# Patient Record
Sex: FEMALE | Race: WHITE | HISPANIC OR LATINO
[De-identification: names, ages, dates, MRNs, and addresses within clinical notes are randomized per-mention and may not be internally consistent; named-entity substitution may affect disease eponyms.]

---

## 2017-01-04 ENCOUNTER — APPOINTMENT (OUTPATIENT)
Dept: CARDIOLOGY | Facility: HOSPITAL | Age: 47
End: 2017-01-04

## 2017-02-09 ENCOUNTER — APPOINTMENT (OUTPATIENT)
Dept: OPHTHALMOLOGY | Facility: CLINIC | Age: 47
End: 2017-02-09

## 2017-02-09 ENCOUNTER — APPOINTMENT (OUTPATIENT)
Dept: OBGYN | Facility: CLINIC | Age: 47
End: 2017-02-09

## 2017-03-16 ENCOUNTER — LABORATORY RESULT (OUTPATIENT)
Age: 47
End: 2017-03-16

## 2017-03-16 ENCOUNTER — APPOINTMENT (OUTPATIENT)
Dept: OPHTHALMOLOGY | Facility: CLINIC | Age: 47
End: 2017-03-16

## 2017-03-16 ENCOUNTER — OUTPATIENT (OUTPATIENT)
Dept: OUTPATIENT SERVICES | Facility: HOSPITAL | Age: 47
LOS: 1 days | End: 2017-03-16
Payer: SELF-PAY

## 2017-03-16 ENCOUNTER — OUTPATIENT (OUTPATIENT)
Dept: OUTPATIENT SERVICES | Facility: HOSPITAL | Age: 47
LOS: 1 days | End: 2017-03-16

## 2017-03-16 ENCOUNTER — APPOINTMENT (OUTPATIENT)
Dept: OBGYN | Facility: CLINIC | Age: 47
End: 2017-03-16

## 2017-03-16 VITALS — BODY MASS INDEX: 25.45 KG/M2 | DIASTOLIC BLOOD PRESSURE: 80 MMHG | SYSTOLIC BLOOD PRESSURE: 120 MMHG | WEIGHT: 126 LBS

## 2017-03-16 DIAGNOSIS — N76.0 ACUTE VAGINITIS: ICD-10-CM

## 2017-03-16 PROCEDURE — 82670 ASSAY OF TOTAL ESTRADIOL: CPT

## 2017-03-16 PROCEDURE — G0463: CPT

## 2017-03-16 PROCEDURE — 83001 ASSAY OF GONADOTROPIN (FSH): CPT

## 2017-03-17 LAB
ESTRADIOL FREE SERPL-MCNC: <5 PG/ML — SIGNIFICANT CHANGE UP
FSH SERPL-MCNC: 67 IU/L — SIGNIFICANT CHANGE UP

## 2017-03-22 DIAGNOSIS — Z01.419 ENCOUNTER FOR GYNECOLOGICAL EXAMINATION (GENERAL) (ROUTINE) WITHOUT ABNORMAL FINDINGS: ICD-10-CM

## 2017-03-29 ENCOUNTER — APPOINTMENT (OUTPATIENT)
Dept: MAMMOGRAPHY | Facility: CLINIC | Age: 47
End: 2017-03-29

## 2017-04-13 ENCOUNTER — APPOINTMENT (OUTPATIENT)
Dept: MAMMOGRAPHY | Facility: CLINIC | Age: 47
End: 2017-04-13

## 2017-04-13 ENCOUNTER — APPOINTMENT (OUTPATIENT)
Dept: ULTRASOUND IMAGING | Facility: CLINIC | Age: 47
End: 2017-04-13

## 2017-04-13 ENCOUNTER — OUTPATIENT (OUTPATIENT)
Dept: OUTPATIENT SERVICES | Facility: HOSPITAL | Age: 47
LOS: 1 days | End: 2017-04-13
Payer: COMMERCIAL

## 2017-04-13 DIAGNOSIS — Z00.8 ENCOUNTER FOR OTHER GENERAL EXAMINATION: ICD-10-CM

## 2017-04-13 PROCEDURE — 76830 TRANSVAGINAL US NON-OB: CPT

## 2017-04-13 PROCEDURE — 77067 SCR MAMMO BI INCL CAD: CPT

## 2017-04-13 PROCEDURE — 76641 ULTRASOUND BREAST COMPLETE: CPT

## 2017-04-13 PROCEDURE — 77063 BREAST TOMOSYNTHESIS BI: CPT

## 2017-04-18 DIAGNOSIS — R10.2 PELVIC AND PERINEAL PAIN: ICD-10-CM

## 2017-04-18 DIAGNOSIS — R92.2 INCONCLUSIVE MAMMOGRAM: ICD-10-CM

## 2017-04-18 DIAGNOSIS — Z12.31 ENCOUNTER FOR SCREENING MAMMOGRAM FOR MALIGNANT NEOPLASM OF BREAST: ICD-10-CM

## 2017-06-08 DIAGNOSIS — H04.123 DRY EYE SYNDROME OF BILATERAL LACRIMAL GLANDS: ICD-10-CM

## 2017-11-24 ENCOUNTER — OUTPATIENT (OUTPATIENT)
Dept: OUTPATIENT SERVICES | Facility: HOSPITAL | Age: 47
LOS: 1 days | End: 2017-11-24
Payer: COMMERCIAL

## 2017-11-24 ENCOUNTER — APPOINTMENT (OUTPATIENT)
Dept: RADIOLOGY | Facility: CLINIC | Age: 47
End: 2017-11-24
Payer: COMMERCIAL

## 2017-11-24 DIAGNOSIS — Z00.8 ENCOUNTER FOR OTHER GENERAL EXAMINATION: ICD-10-CM

## 2017-11-24 PROCEDURE — 72100 X-RAY EXAM L-S SPINE 2/3 VWS: CPT

## 2017-11-24 PROCEDURE — 72100 X-RAY EXAM L-S SPINE 2/3 VWS: CPT | Mod: 26

## 2018-03-03 ENCOUNTER — OUTPATIENT (OUTPATIENT)
Dept: OUTPATIENT SERVICES | Facility: HOSPITAL | Age: 48
LOS: 1 days | End: 2018-03-03
Payer: COMMERCIAL

## 2018-03-03 ENCOUNTER — APPOINTMENT (OUTPATIENT)
Dept: ULTRASOUND IMAGING | Facility: CLINIC | Age: 48
End: 2018-03-03
Payer: COMMERCIAL

## 2018-03-03 DIAGNOSIS — Z00.8 ENCOUNTER FOR OTHER GENERAL EXAMINATION: ICD-10-CM

## 2018-03-03 PROCEDURE — 76856 US EXAM PELVIC COMPLETE: CPT

## 2018-03-03 PROCEDURE — 76830 TRANSVAGINAL US NON-OB: CPT | Mod: 26

## 2018-03-03 PROCEDURE — 76830 TRANSVAGINAL US NON-OB: CPT

## 2018-03-03 PROCEDURE — 76856 US EXAM PELVIC COMPLETE: CPT | Mod: 26

## 2018-03-20 ENCOUNTER — RESULT REVIEW (OUTPATIENT)
Age: 48
End: 2018-03-20

## 2018-04-20 ENCOUNTER — APPOINTMENT (OUTPATIENT)
Dept: MAMMOGRAPHY | Facility: CLINIC | Age: 48
End: 2018-04-20
Payer: COMMERCIAL

## 2018-04-20 ENCOUNTER — APPOINTMENT (OUTPATIENT)
Dept: ULTRASOUND IMAGING | Facility: CLINIC | Age: 48
End: 2018-04-20
Payer: COMMERCIAL

## 2018-04-20 ENCOUNTER — OUTPATIENT (OUTPATIENT)
Dept: OUTPATIENT SERVICES | Facility: HOSPITAL | Age: 48
LOS: 1 days | End: 2018-04-20
Payer: COMMERCIAL

## 2018-04-20 DIAGNOSIS — Z00.8 ENCOUNTER FOR OTHER GENERAL EXAMINATION: ICD-10-CM

## 2018-04-20 PROCEDURE — G0279: CPT | Mod: 26

## 2018-04-20 PROCEDURE — 77066 DX MAMMO INCL CAD BI: CPT

## 2018-04-20 PROCEDURE — 77066 DX MAMMO INCL CAD BI: CPT | Mod: 26

## 2018-04-20 PROCEDURE — G0279: CPT

## 2018-04-20 PROCEDURE — 76641 ULTRASOUND BREAST COMPLETE: CPT | Mod: 26,LT

## 2018-04-20 PROCEDURE — 76641 ULTRASOUND BREAST COMPLETE: CPT

## 2019-04-12 ENCOUNTER — APPOINTMENT (OUTPATIENT)
Dept: RHEUMATOLOGY | Facility: HOSPITAL | Age: 49
End: 2019-04-12

## 2019-04-12 ENCOUNTER — OUTPATIENT (OUTPATIENT)
Dept: OUTPATIENT SERVICES | Facility: HOSPITAL | Age: 49
LOS: 1 days | End: 2019-04-12
Payer: SELF-PAY

## 2019-04-12 VITALS
RESPIRATION RATE: 16 BRPM | SYSTOLIC BLOOD PRESSURE: 118 MMHG | BODY MASS INDEX: 23.99 KG/M2 | HEART RATE: 74 BPM | WEIGHT: 119 LBS | DIASTOLIC BLOOD PRESSURE: 76 MMHG | HEIGHT: 59 IN

## 2019-04-12 DIAGNOSIS — M06.9 RHEUMATOID ARTHRITIS, UNSPECIFIED: ICD-10-CM

## 2019-04-12 LAB
ERYTHROCYTE [SEDIMENTATION RATE] IN BLOOD: 28 MM/HR — HIGH (ref 0–15)
RHEUMATOID FACT SERPL-ACNC: <10 IU/ML — SIGNIFICANT CHANGE UP (ref 0–13)

## 2019-04-12 PROCEDURE — 85652 RBC SED RATE AUTOMATED: CPT

## 2019-04-12 PROCEDURE — 86235 NUCLEAR ANTIGEN ANTIBODY: CPT

## 2019-04-12 PROCEDURE — G0463: CPT

## 2019-04-12 PROCEDURE — 86038 ANTINUCLEAR ANTIBODIES: CPT

## 2019-04-12 PROCEDURE — 86431 RHEUMATOID FACTOR QUANT: CPT

## 2019-04-12 RX ORDER — CHOLECALCIFEROL (VITAMIN D3) 125 MCG
50 MCG TABLET ORAL DAILY
Refills: 0 | Status: ACTIVE | COMMUNITY
Start: 2019-04-12

## 2019-04-12 NOTE — PHYSICAL EXAM
[General Appearance - In No Acute Distress] : in no acute distress [General Appearance - Well Nourished] : well nourished [General Appearance - Alert] : alert [General Appearance - Well-Appearing] : healthy appearing [Sclera] : the sclera and conjunctiva were normal [PERRL With Normal Accommodation] : pupils were equal in size, round, and reactive to light [Extraocular Movements] : extraocular movements were intact [Hearing Threshold Finger Rub Not [place]] : hearing was normal [Outer Ear] : the ears and nose were normal in appearance [Neck Appearance] : the appearance of the neck was normal [Nasal Cavity] : the nasal mucosa and septum were normal [] : no respiratory distress [Heart Sounds] : normal S1 and S2 [Auscultation Breath Sounds / Voice Sounds] : lungs were clear to auscultation bilaterally [Murmurs] : no murmurs [Edema] : there was no peripheral edema [Abdomen Soft] : soft [Cervical Lymph Nodes Enlarged Posterior Bilaterally] : posterior cervical [Abdomen Tenderness] : non-tender [Cervical Lymph Nodes Enlarged Anterior Bilaterally] : anterior cervical [No Spinal Tenderness] : no spinal tenderness [Supraclavicular Lymph Nodes Enlarged Bilaterally] : supraclavicular [No Focal Deficits] : no focal deficits [Oriented To Time, Place, And Person] : oriented to person, place, and time [FreeTextEntry1] : DIEGO on LE b/l

## 2019-04-12 NOTE — END OF VISIT
[] : Fellow [FreeTextEntry3] : Patient seen with Dr Schwarz. Joint pains, muscle pains and fatigue with recent worsening. Strong family history of RA. No evidence of joint inflammation. Serologies negative. Reflux++. Check for H pylori. Gyn to deal with menopause symptoms. Can return if symptoms worsen.  [>50% of Time Spent on Counseling for ____] : Greater than 50% of the encounter time was spent on counseling for [unfilled] [Time Spent: ___ minutes] : I have spent [unfilled] minutes of face to face time with the patient

## 2019-04-12 NOTE — HISTORY OF PRESENT ILLNESS
[Malaise] : malaise [Fatigue] : fatigue [Skin Lesions] : skin lesions [Dry Mouth] : dry mouth [Arthralgias] : arthralgias [Morning Stiffness] : morning stiffness [Dry Eyes] : dry eyes [Anorexia] : no anorexia [Weight Loss] : no weight loss [Fever] : no fever [Chills] : no chills [Depression] : no depression [Malar Facial Rash] : no malar facial rash [Skin Nodules] : no skin nodules [Oral Ulcers] : no oral ulcers [Dysphonia] : no dysphonia [Cough] : no cough [Dysphagia] : no dysphagia [Shortness of Breath] : no shortness of breath [Chest Pain] : no chest pain [Joint Swelling] : no joint swelling [Joint Warmth] : no joint warmth [Joint Deformity] : no joint deformity [Decreased ROM] : no decreased range of motion [Falls] : no falls [Difficulty Standing] : no difficulty standing [Difficulty Walking] : no difficulty walking [Dyspnea] : no dyspnea [Muscle Weakness] : no muscle weakness [Myalgias] : no myalgias [Muscle Spasms] : no muscle spasms [Muscle Cramping] : no muscle cramping [Visual Changes] : no visual changes [Eye Pain] : no eye pain [Eye Redness] : no eye redness [FreeTextEntry1] : has hx of occasional skin rashes that are itchy, which then go away.

## 2019-04-12 NOTE — ASSESSMENT
[FreeTextEntry1] : 48yoF hx of GERD p/w fatigue and occasional joint pains\par \par Pt with +dry eyes, +dry mouth, fatigue with family history of autoimmune disease in daughter, sister, and mother. \par Labs from outside PMD 2/2019 with -SUGEY RF ESR 39\par \par In setting of current symptoms and fam hx, \par - Will recheck SUGEY, SSA/SSB, repeat RF and ESR\par - Will check stool H. Pylori for eval for GERD\par - Advised Tylenol PRN for pain and exercise for help with fatigue \par - Advised improved sleep hygiene and GYN f/u for menopause symptoms\par \par RTC in 3 months or PRN\par d/w Dr. Mazariegos

## 2019-04-12 NOTE — DATA REVIEWED
[FreeTextEntry1] : 2/11/19 labs from outside facility reviewed:\par ESR 39\par CBC wnl 13.6/42.8, CMP wnl\par TSH wnl\par -SUGEY -RF\par Vitamin D 26.7

## 2019-04-12 NOTE — REVIEW OF SYSTEMS
[Feeling Tired] : feeling tired [Dry Eyes] : dryness of the eyes [As Noted in HPI] : as noted in HPI [Arthralgias] : arthralgias [Joint Pain] : joint pain [Negative] : Endocrine [Chills] : no chills [Fever] : no fever [Eye Pain] : no eye pain [Eyesight Problems] : no eyesight problems [Red Eyes] : eyes not red [de-identified] : hyperpigmentation from prior warts, +possible Raynaud's

## 2019-04-13 LAB
DSDNA AB FLD-ACNC: <0.2 AI — SIGNIFICANT CHANGE UP
ENA SS-A AB FLD IA-ACNC: <0.2 AI — SIGNIFICANT CHANGE UP

## 2019-04-15 DIAGNOSIS — R53.83 OTHER FATIGUE: ICD-10-CM

## 2019-04-15 DIAGNOSIS — M35.00 SJOGREN SYNDROME, UNSPECIFIED: ICD-10-CM

## 2019-04-16 ENCOUNTER — OUTPATIENT (OUTPATIENT)
Dept: OUTPATIENT SERVICES | Facility: HOSPITAL | Age: 49
LOS: 1 days | End: 2019-04-16
Payer: SELF-PAY

## 2019-04-16 DIAGNOSIS — M35.00 SJOGREN SYNDROME, UNSPECIFIED: ICD-10-CM

## 2019-04-16 DIAGNOSIS — R53.83 OTHER FATIGUE: ICD-10-CM

## 2019-04-16 LAB — ANA TITR SER: NEGATIVE — SIGNIFICANT CHANGE UP

## 2019-04-16 PROCEDURE — 87338 HPYLORI STOOL AG IA: CPT

## 2019-04-22 LAB — H PYLORI AG STL QL: SIGNIFICANT CHANGE UP

## 2019-07-22 ENCOUNTER — APPOINTMENT (OUTPATIENT)
Dept: DERMATOLOGY | Facility: CLINIC | Age: 49
End: 2019-07-22

## 2020-06-16 ENCOUNTER — APPOINTMENT (OUTPATIENT)
Dept: RHEUMATOLOGY | Facility: CLINIC | Age: 50
End: 2020-06-16

## 2020-10-23 ENCOUNTER — APPOINTMENT (OUTPATIENT)
Dept: RHEUMATOLOGY | Facility: CLINIC | Age: 50
End: 2020-10-23
Payer: MEDICAID

## 2020-10-23 VITALS
HEIGHT: 59 IN | HEART RATE: 74 BPM | DIASTOLIC BLOOD PRESSURE: 81 MMHG | WEIGHT: 118 LBS | BODY MASS INDEX: 23.79 KG/M2 | TEMPERATURE: 98.9 F | SYSTOLIC BLOOD PRESSURE: 115 MMHG | OXYGEN SATURATION: 100 %

## 2020-10-23 DIAGNOSIS — Z82.61 FAMILY HISTORY OF ARTHRITIS: ICD-10-CM

## 2020-10-23 DIAGNOSIS — R53.83 OTHER FATIGUE: ICD-10-CM

## 2020-10-23 DIAGNOSIS — M35.00 SICCA SYNDROME, UNSPECIFIED: ICD-10-CM

## 2020-10-23 DIAGNOSIS — M25.50 PAIN IN UNSPECIFIED JOINT: ICD-10-CM

## 2020-10-23 PROCEDURE — 99072 ADDL SUPL MATRL&STAF TM PHE: CPT

## 2020-10-23 PROCEDURE — 99214 OFFICE O/P EST MOD 30 MIN: CPT | Mod: 25

## 2020-11-16 PROBLEM — M35.00 SICCA: Status: ACTIVE | Noted: 2019-04-12

## 2020-12-17 ENCOUNTER — TRANSCRIPTION ENCOUNTER (OUTPATIENT)
Age: 50
End: 2020-12-17

## 2020-12-18 ENCOUNTER — RESULT REVIEW (OUTPATIENT)
Age: 50
End: 2020-12-18

## 2020-12-18 ENCOUNTER — OUTPATIENT (OUTPATIENT)
Dept: OUTPATIENT SERVICES | Facility: HOSPITAL | Age: 50
LOS: 1 days | End: 2020-12-18
Payer: COMMERCIAL

## 2020-12-18 DIAGNOSIS — K21.00 GASTRO-ESOPHAGEAL REFLUX DISEASE WITH ESOPHAGITIS, WITHOUT BLEEDING: ICD-10-CM

## 2020-12-18 DIAGNOSIS — R10.13 EPIGASTRIC PAIN: ICD-10-CM

## 2020-12-18 DIAGNOSIS — Z12.11 ENCOUNTER FOR SCREENING FOR MALIGNANT NEOPLASM OF COLON: ICD-10-CM

## 2020-12-18 LAB — HCG SERPL-ACNC: 2 MIU/ML — SIGNIFICANT CHANGE UP

## 2020-12-18 PROCEDURE — 88313 SPECIAL STAINS GROUP 2: CPT

## 2020-12-18 PROCEDURE — 88312 SPECIAL STAINS GROUP 1: CPT

## 2020-12-18 PROCEDURE — 84702 CHORIONIC GONADOTROPIN TEST: CPT

## 2020-12-18 PROCEDURE — 88305 TISSUE EXAM BY PATHOLOGIST: CPT

## 2020-12-18 PROCEDURE — 36415 COLL VENOUS BLD VENIPUNCTURE: CPT

## 2020-12-18 PROCEDURE — 43239 EGD BIOPSY SINGLE/MULTIPLE: CPT

## 2020-12-18 PROCEDURE — 88312 SPECIAL STAINS GROUP 1: CPT | Mod: 26

## 2020-12-18 PROCEDURE — 45380 COLONOSCOPY AND BIOPSY: CPT | Mod: PT

## 2020-12-18 PROCEDURE — 88313 SPECIAL STAINS GROUP 2: CPT | Mod: 26

## 2020-12-18 PROCEDURE — 88305 TISSUE EXAM BY PATHOLOGIST: CPT | Mod: 26

## 2020-12-21 LAB — SURGICAL PATHOLOGY STUDY: SIGNIFICANT CHANGE UP

## 2023-08-14 ENCOUNTER — EMERGENCY (EMERGENCY)
Facility: HOSPITAL | Age: 53
LOS: 1 days | Discharge: HOME | End: 2023-08-14
Attending: STUDENT IN AN ORGANIZED HEALTH CARE EDUCATION/TRAINING PROGRAM
Payer: COMMERCIAL

## 2023-08-14 VITALS
OXYGEN SATURATION: 100 % | DIASTOLIC BLOOD PRESSURE: 80 MMHG | RESPIRATION RATE: 18 BRPM | SYSTOLIC BLOOD PRESSURE: 140 MMHG | HEART RATE: 85 BPM

## 2023-08-14 VITALS
RESPIRATION RATE: 18 BRPM | HEIGHT: 63 IN | TEMPERATURE: 98 F | HEART RATE: 84 BPM | DIASTOLIC BLOOD PRESSURE: 92 MMHG | SYSTOLIC BLOOD PRESSURE: 158 MMHG | OXYGEN SATURATION: 98 % | WEIGHT: 134.92 LBS

## 2023-08-14 PROCEDURE — 99284 EMERGENCY DEPT VISIT MOD MDM: CPT

## 2023-08-14 PROCEDURE — 73060 X-RAY EXAM OF HUMERUS: CPT

## 2023-08-14 PROCEDURE — 99284 EMERGENCY DEPT VISIT MOD MDM: CPT | Mod: 25

## 2023-08-14 PROCEDURE — 73060 X-RAY EXAM OF HUMERUS: CPT | Mod: 26,RT

## 2023-08-14 PROCEDURE — 73030 X-RAY EXAM OF SHOULDER: CPT

## 2023-08-14 PROCEDURE — 73030 X-RAY EXAM OF SHOULDER: CPT | Mod: 26,RT

## 2023-08-14 RX ORDER — ACETAMINOPHEN 500 MG
975 TABLET ORAL ONCE
Refills: 0 | Status: COMPLETED | OUTPATIENT
Start: 2023-08-14 | End: 2023-08-14

## 2023-08-14 RX ORDER — IBUPROFEN 200 MG
400 TABLET ORAL ONCE
Refills: 0 | Status: COMPLETED | OUTPATIENT
Start: 2023-08-14 | End: 2023-08-14

## 2023-08-14 RX ORDER — LIDOCAINE 4 G/100G
1 CREAM TOPICAL ONCE
Refills: 0 | Status: COMPLETED | OUTPATIENT
Start: 2023-08-14 | End: 2023-08-14

## 2023-08-14 RX ADMIN — Medication 400 MILLIGRAM(S): at 09:10

## 2023-08-14 RX ADMIN — LIDOCAINE 1 PATCH: 4 CREAM TOPICAL at 09:09

## 2023-08-14 RX ADMIN — Medication 975 MILLIGRAM(S): at 09:10

## 2023-08-14 NOTE — ED PROVIDER NOTE - CLINICAL SUMMARY MEDICAL DECISION MAKING FREE TEXT BOX
53 female R hand dominant no past medical history presenting with right shoulder pain. DDx includes but not limited to: fracture vs dislocation vs sprain vs bursitis vs tendinopathy. Septic joint not likely given exam and history. Plan: pain control, sling, R shoulder and humerus xrays. Likely dc with ortho f/u. Will re-assess.

## 2023-08-14 NOTE — ED PROVIDER NOTE - NSFOLLOWUPINSTRUCTIONS_ED_ALL_ED_FT
You have been evaluated in the Emergency Department today for right shoulder pain.     For pain, you can take TYLENOL/ACETAMINOPHEN up to 4,000mg a day for your symptoms in four divided doses and MOTRIN/IBUPROFEN up to 2,400mg a day in four divided doses (for up to 5 days with food).    Please also wear the sling for comfort.    Please have your MRI as planned on Friday.    The Hospital will call you to schedule an appointment with an orthopedic doctor in a couple days.     Return to the Emergency Department if you experience worsening or uncontrolled pain, fevers 100.4°F or greater, weakness, worsening numbness/tingling, or any other concerning symptoms.    Thank you for choosing us for your care. You have been evaluated in the Emergency Department today for right shoulder pain. The xrays showed no fracture or dislocation.     You cannot lift anything with the right arm that is heavier than 5 pounds.     For pain, you can take TYLENOL/ACETAMINOPHEN up to 4,000mg a day for your symptoms in four divided doses and MOTRIN/IBUPROFEN up to 2,400mg a day in four divided doses (for up to 5 days with food).    You can also get lidocaine patches over the counter at the pharmacy. Place 1 patch on your shoulder for 12 hours and then take it off for 12 hours before applying a new patch.     Please also wear the sling for comfort and you can apply ice or heat to the area.    Please have your MRI as planned on Friday.    The Hospital will call you to schedule an appointment with an orthopedic doctor in a couple days.     Return to the Emergency Department if you experience worsening or uncontrolled pain, fevers 100.4°F or greater, weakness, worsening numbness/tingling, or any other concerning symptoms.    Thank you for choosing us for your care.

## 2023-08-14 NOTE — ED PROVIDER NOTE - PROGRESS NOTE DETAILS
Laura Sena M.D. (Resident Physician): Xrays show no fracture or dislocation. Pt says the pain feels better. Will dc with ortho f/u.

## 2023-08-14 NOTE — ED ADULT NURSE NOTE - OBJECTIVE STATEMENT
54 y/o F A&Ox4 w/ no significant PMH presents to ED complaining of RUE pain. Pt states that a few weeks ago she hit her right arm on the car window and injured it again a few days ago. Pt endorsing pain in right shoulder upon movement. Pt states that she has been taking Aleve around the clock for pain. On initial assessment, pt is keeping right arm bent and states it hurts to straighten arm. No obvious trauma, deformities and hematoma noted to the extremity. Pt strength and sensation is intact. Pt has full ROM when assisting with her left hand. On palpation, pt jumps in pain. Pt denies any radiating to the neck or tingling/numbness in the fingers. Pt denies headache, fever, chills, chest pain, N/V/D. 54 y/o F A&Ox4 w/ no significant PMH presents to ED complaining of RUE pain. Pt states that a few weeks ago she injured her arm by hyperextending it when trying to shut the car door and re-injured it again a few days ago with the same motion. Pt endorsing pain in right shoulder upon movement. Pt states that she has been taking Aleve around the clock for pain. On initial assessment, pt is keeping right arm bent and states it hurts to straighten arm. No obvious trauma, deformities and hematoma noted to the extremity. Pt strength and sensation is intact. Pt has full ROM when assisting with her left hand. On palpation, pt jumps in pain. Pt denies any radiating to the neck or tingling/numbness in the fingers. Pt denies headache, fever, chills, chest pain, N/V/D.

## 2023-08-14 NOTE — ED PROVIDER NOTE - PHYSICAL EXAMINATION
PHYSICAL EXAM:  GENERAL: Sitting comfortable in bed, in no acute distress  HENMT: Atraumatic, moist mucous membranes EYES: Clear bilaterally, PERRL, EOMs intact b/l  HEART: Regular rate and regular rhythm, S1/S2  RESPIRATORY: Clear to auscultation bilaterally, no wheezes/rhonchi/rales  ABDOMEN: Soft, nontender, nondistended  MSK: No spinal or paraspinal ttp  EXTREMITIES: RUE with ttp over the anterior shoulder and no edema, ROM limited 2/2 pain, passively able to raise the arm to 90 degrees, no lower extremity edema, +2 radial pulses b/l  NEURO: A&Ox4, no focal motor deficits or sensory deficits   SKIN: No shoulder erythema or evidence of rash

## 2023-08-14 NOTE — ED PROVIDER NOTE - ATTENDING CONTRIBUTION TO CARE
53 F w/ R hand dominant here w/ R shoulder pain, pt w/ no numbness in the arm, reports prior injury to the arm, pt w/ outpt MRI scheduled this week, pt had injured arm again on Friday (w/ arm lifting upwhen trying to kill a bug) no head strike, no loc and then came to the ER this am, no home meds taken this AM, pt w/ improvement in pain w/ oral analgesics last doseyesterday PM.   On exam, pt is awake and alert has pain along the R AC joint, pt w/ no clavicular tenderness, pt w/ intact radial medial an dulnar nerve, pt w/ intact sensaiton in the arm to light touch, pt w/ 2+ radial pulse b/l pt w/ ability to range the R shoulder w/ abduction to 90 degrees, pt able to touch the L shoulder w/ R hand, pt w/ intact wrist flexion extension, intact elbow flexion and extension w/out difficulty. no overlying redness or erythema of the R shoulder, clear lungs b/l , no midline c/ spine tenderenss concern for possible ac joint separation, vs rotator cuff strain PLan for outpt follow up w/ PCP, low supicion for joint infection, no systemic sx,

## 2023-08-14 NOTE — ED PROVIDER NOTE - OBJECTIVE STATEMENT
53 female R hand dominant no past medical history presenting with right shoulder pain.  Patient says that 3 months ago she was closing her wet car door with her right hand and her arm slipped and extended upward.  She had right shoulder pain, which resolved over time with rest and Aleve.  3 days ago patient was trying to kill a fly and swatted at it with her right hand causing her to re-injure the area.  Patient has been taking Aleve every 8 hours for the past 2 days, last dose yesterday at 9 PM.  Has some tingling over the right shoulder.  Denies fever, chest pain, shortness of breath, weakness, neck pain, back pain, abdominal pain.  Has an MRI scheduled through her PMD for this Friday.

## 2023-08-14 NOTE — ED ADULT TRIAGE NOTE - CHIEF COMPLAINT QUOTE
RUE inj c/o pain had inj x few months ago and reinjured  Took Alleve last night 9pm with some relief

## 2023-08-14 NOTE — ED PROVIDER NOTE - ADDITIONAL NOTES AND INSTRUCTIONS:
Ms. Moe is not allowed to lift anything heavier than 5 pounds with her right arm until she sees the Orthopedic doctor and is given further instructions.

## 2023-08-14 NOTE — ED PROVIDER NOTE - PATIENT PORTAL LINK FT
You can access the FollowMyHealth Patient Portal offered by [facility] by registering at the following website: [email or web address]. By joining [facility]’s FollowMyHealth portal, you will also be able to view your health information using other applications (apps) compatible with our system.

## 2023-08-30 ENCOUNTER — APPOINTMENT (OUTPATIENT)
Dept: ORTHOPEDIC SURGERY | Facility: CLINIC | Age: 53
End: 2023-08-30

## 2024-12-13 ENCOUNTER — APPOINTMENT (OUTPATIENT)
Dept: RHEUMATOLOGY | Facility: CLINIC | Age: 54
End: 2024-12-13

## 2025-03-14 ENCOUNTER — APPOINTMENT (OUTPATIENT)
Dept: RHEUMATOLOGY | Facility: CLINIC | Age: 55
End: 2025-03-14